# Patient Record
Sex: MALE | Race: BLACK OR AFRICAN AMERICAN
[De-identification: names, ages, dates, MRNs, and addresses within clinical notes are randomized per-mention and may not be internally consistent; named-entity substitution may affect disease eponyms.]

---

## 2021-09-17 PROBLEM — Z00.00 ENCOUNTER FOR PREVENTIVE HEALTH EXAMINATION: Status: ACTIVE | Noted: 2021-09-17

## 2021-09-23 ENCOUNTER — APPOINTMENT (OUTPATIENT)
Dept: UROLOGY | Facility: CLINIC | Age: 51
End: 2021-09-23
Payer: COMMERCIAL

## 2021-09-23 ENCOUNTER — NON-APPOINTMENT (OUTPATIENT)
Age: 51
End: 2021-09-23

## 2021-09-23 VITALS
SYSTOLIC BLOOD PRESSURE: 194 MMHG | WEIGHT: 162 LBS | BODY MASS INDEX: 25.43 KG/M2 | DIASTOLIC BLOOD PRESSURE: 133 MMHG | TEMPERATURE: 98.6 F | HEART RATE: 91 BPM | HEIGHT: 67 IN

## 2021-09-23 PROCEDURE — 99203 OFFICE O/P NEW LOW 30 MIN: CPT

## 2021-09-23 NOTE — PHYSICAL EXAM
[Urethral Meatus] : meatus normal [Penis Abnormality] : normal circumcised penis [Scrotum] : the scrotum was normal [Epididymis] : the epididymides were normal [Testes Mass (___cm)] : there were no testicular masses [FreeTextEntry1] : Bilateral 12cc testes

## 2021-09-23 NOTE — HISTORY OF PRESENT ILLNESS
[FreeTextEntry1] : Mr. Laws is here due to ED \par \par \par he is a father of 25 y and 19 y\par \par ERECTION IS 5-6/10\par GOOD DESIRE\par no morning erection \par also complaining of premature ejaculation 1-2 minutes after penetration\par \par Medical History Hypertension stopped many years ago\par surgical history of back lipoma \par \par he is intersted in trying PDE5 inhibitorsa

## 2021-09-27 LAB
APPEARANCE: CLEAR
BACTERIA UR CULT: NORMAL
BACTERIA: NEGATIVE
BILIRUBIN URINE: NEGATIVE
BLOOD URINE: ABNORMAL
COLOR: YELLOW
GLUCOSE QUALITATIVE U: NEGATIVE
HYALINE CASTS: 0 /LPF
KETONES URINE: NORMAL
LEUKOCYTE ESTERASE URINE: NEGATIVE
MICROSCOPIC-UA: NORMAL
NITRITE URINE: NEGATIVE
PH URINE: 5.5
PROTEIN URINE: NORMAL
PSA SERPL-MCNC: 1.08 NG/ML
RED BLOOD CELLS URINE: 2 /HPF
SPECIFIC GRAVITY URINE: 1.02
SQUAMOUS EPITHELIAL CELLS: 0 /HPF
UROBILINOGEN URINE: NORMAL
WHITE BLOOD CELLS URINE: 1 /HPF

## 2021-10-19 RX ORDER — SILDENAFIL 100 MG/1
100 TABLET, FILM COATED ORAL
Qty: 6 | Refills: 11 | Status: DISCONTINUED | COMMUNITY
Start: 2021-09-23 | End: 2021-10-19

## 2021-10-28 ENCOUNTER — APPOINTMENT (OUTPATIENT)
Dept: UROLOGY | Facility: CLINIC | Age: 51
End: 2021-10-28
Payer: COMMERCIAL

## 2021-10-28 VITALS — HEART RATE: 93 BPM | DIASTOLIC BLOOD PRESSURE: 122 MMHG | SYSTOLIC BLOOD PRESSURE: 184 MMHG | TEMPERATURE: 98.5 F

## 2021-10-28 PROCEDURE — 99215 OFFICE O/P EST HI 40 MIN: CPT

## 2021-10-28 NOTE — HISTORY OF PRESENT ILLNESS
[Currently Experiencing ___] :  [unfilled] [Erectile Dysfunction] : Erectile Dysfunction [None] : None [FreeTextEntry1] : 51 yr old male with Hx of HTN and ED\par has not tried tadalafil 20 mg\par Good desire, 5-6/10 erection\par no morning erection\par Complains of left side penile curvature on erection for a year - bothersome\par Denies dysuria, hematuria, flank pain

## 2021-10-28 NOTE — PHYSICAL EXAM
[General Appearance - Well Developed] : well developed [General Appearance - Well Nourished] : well nourished [Normal Appearance] : normal appearance [Well Groomed] : well groomed [General Appearance - In No Acute Distress] : no acute distress [Abdomen Soft] : soft [Abdomen Tenderness] : non-tender [Costovertebral Angle Tenderness] : no ~M costovertebral angle tenderness [Urethral Meatus] : meatus normal [Penis Abnormality] : normal uncircumcised penis [Urinary Bladder Findings] : the bladder was normal on palpation [Scrotum] : the scrotum was normal [No Prostate Nodules] : no prostate nodules [Edema] : no peripheral edema [] : no respiratory distress [Respiration, Rhythm And Depth] : normal respiratory rhythm and effort [Exaggerated Use Of Accessory Muscles For Inspiration] : no accessory muscle use [Oriented To Time, Place, And Person] : oriented to person, place, and time [Affect] : the affect was normal [Mood] : the mood was normal [Not Anxious] : not anxious [Normal Station and Gait] : the gait and station were normal for the patient's age [No Focal Deficits] : no focal deficits [No Palpable Adenopathy] : no palpable adenopathy [FreeTextEntry1] : no palpable plaque,

## 2021-10-28 NOTE — ASSESSMENT
[FreeTextEntry1] : Erectile Dysfunction\par Reviewed treatment options\par Reinforced tadalafil 20 mg benefits and s/e\par \par Penile Curvature\par left sided penile curvature on erect\par no palpable plaque\par no pain\par reassured\par Penile doppler for curve assessment \par \par Follow up Penile doppler

## 2021-11-11 ENCOUNTER — APPOINTMENT (OUTPATIENT)
Dept: UROLOGY | Facility: CLINIC | Age: 51
End: 2021-11-11
Payer: COMMERCIAL

## 2021-11-11 VITALS — HEART RATE: 92 BPM | DIASTOLIC BLOOD PRESSURE: 138 MMHG | TEMPERATURE: 98.7 F | SYSTOLIC BLOOD PRESSURE: 190 MMHG

## 2021-11-11 DIAGNOSIS — Q55.61 CURVATURE OF PENIS (LATERAL): ICD-10-CM

## 2021-11-11 PROCEDURE — 99214 OFFICE O/P EST MOD 30 MIN: CPT | Mod: 25

## 2021-11-11 PROCEDURE — 54235 NJX CORPORA CAVERNOSA RX AGT: CPT

## 2021-11-11 PROCEDURE — 93980 PENILE VASCULAR STUDY: CPT

## 2021-11-11 NOTE — ASSESSMENT
[FreeTextEntry1] : chronic peyronies disease\par We had a long conversation regarding treatment options in chronic Peyronie's disease. Treatments, including observation, the role of Xiaflex, penile plication surgery, plaque incision and grafting surgery, and insertion of inflatable penile prosthesis, were discussed at length.\par \par The risks and benefits of Xiaflex were discussed. These include, significant hematoma and a small risk of penile fracture. The perceived and reported benefits of this treatment were discussed at length as well. The fact that he needs to abstain from sexual activity after treatment was discussed as was the length of the treatment regimen. In addition he understands that he will need to use a penile traction device and we spoke about the role of penile modeling. Literature fully delineating what to expect from this treatment was provided to the patient. \par \par In addition, the risks of surgical treatment were discussed. With penile plication, he understands that shortening of the penis may be perceived. In addition, palpable knots from the sutures placed under the skin may be present. Possible risk of injury to the urethra and to other penile structures was discussed at length as well. Decreased penile sensitivity has also been reported. With plaque incision and grafting, the risk of long-standing erectile dysfunction in the postoperative period was discussed. Glans numbness and decreased penile sensitivity as a risk of the procedure was discussed as well.\par \par Finally, the role of inflatable penile prosthesis as a treatment for both Peyronie's disease and erectile dysfunction was discussed. The 1-2% risk of device infection, device malfunction rates, risks of injury to contiguous organs including the urethra, risk of phallic shortening as well as the possiblity that additional intraoperative adjunctive maneuvers would be necessary (including modeling and plication) to get him to a straight phallus was also discussed.\par \par he and his partner will discuss\par f/u PRN\par PSA 1.1\par UA negative, UCX no growth

## 2021-12-29 ENCOUNTER — APPOINTMENT (OUTPATIENT)
Dept: UROLOGY | Facility: CLINIC | Age: 51
End: 2021-12-29
Payer: COMMERCIAL

## 2021-12-29 VITALS — DIASTOLIC BLOOD PRESSURE: 110 MMHG | HEART RATE: 109 BPM | TEMPERATURE: 98.6 F | SYSTOLIC BLOOD PRESSURE: 166 MMHG

## 2021-12-29 DIAGNOSIS — N52.9 MALE ERECTILE DYSFUNCTION, UNSPECIFIED: ICD-10-CM

## 2021-12-29 PROCEDURE — 99213 OFFICE O/P EST LOW 20 MIN: CPT

## 2021-12-30 NOTE — ASSESSMENT
[FreeTextEntry1] : Erectile Dysfunction\par Continue tadalafil 20 mg - refilled\par reassured\par \par Peyronie's disease\par Reviewed treatment options from last visit\par Penile US Nov 1, 2021:\par 40 degree left sided curvature, mid shaft, \par 1.7 mm plaque\par He will make a decision and will call for appointment\par \par PSA 1.08 Sept 23, 2021\par Negative UA and UC\par \par Follow up PRN\par

## 2021-12-30 NOTE — HISTORY OF PRESENT ILLNESS
[Currently Experiencing ___] :  [unfilled] [Erectile Dysfunction] : Erectile Dysfunction [None] : None [FreeTextEntry1] : 51 yr old male with Hx of HTN, ED and Peyronie's\par Returns for follow up \par Reports medication works well\par Currently on tadalafil 20 mg, 8/10 erection\par Bothersome\par PSA 1.08 - Sept 2021\par Denies dysuria, hematuria, flank pain \par \par Penile US Nov11, 2021:\par 40 degree left sided curvature, mid shaft, \par 1.7 mm plaque

## 2021-12-30 NOTE — PHYSICAL EXAM
[General Appearance - Well Developed] : well developed [General Appearance - Well Nourished] : well nourished [Normal Appearance] : normal appearance [Well Groomed] : well groomed [General Appearance - In No Acute Distress] : no acute distress [Abdomen Soft] : soft [Abdomen Tenderness] : non-tender [Costovertebral Angle Tenderness] : no ~M costovertebral angle tenderness [Urethral Meatus] : meatus normal [Urinary Bladder Findings] : the bladder was normal on palpation [Scrotum] : the scrotum was normal [Testes Mass (___cm)] : there were no testicular masses [Edema] : no peripheral edema [] : no respiratory distress [Respiration, Rhythm And Depth] : normal respiratory rhythm and effort [Exaggerated Use Of Accessory Muscles For Inspiration] : no accessory muscle use [Oriented To Time, Place, And Person] : oriented to person, place, and time [Affect] : the affect was normal [Mood] : the mood was normal [Not Anxious] : not anxious [Normal Station and Gait] : the gait and station were normal for the patient's age [No Focal Deficits] : no focal deficits [No Palpable Adenopathy] : no palpable adenopathy [FreeTextEntry1] : no palpable plaque

## 2022-03-01 RX ORDER — COLLAGENASE CLOSTRIDIUM HISTOLYTICUM 0.9 MG
0.9 KIT INJECTION
Qty: 2 | Refills: 4 | Status: ACTIVE | COMMUNITY
Start: 2022-03-01 | End: 1900-01-01

## 2022-03-30 ENCOUNTER — APPOINTMENT (OUTPATIENT)
Dept: UROLOGY | Facility: CLINIC | Age: 52
End: 2022-03-30

## 2022-04-01 ENCOUNTER — APPOINTMENT (OUTPATIENT)
Dept: UROLOGY | Facility: CLINIC | Age: 52
End: 2022-04-01
Payer: COMMERCIAL

## 2022-04-01 VITALS
SYSTOLIC BLOOD PRESSURE: 167 MMHG | OXYGEN SATURATION: 98 % | DIASTOLIC BLOOD PRESSURE: 101 MMHG | HEIGHT: 67 IN | WEIGHT: 155 LBS | TEMPERATURE: 98.6 F | HEART RATE: 106 BPM | BODY MASS INDEX: 24.33 KG/M2

## 2022-04-01 PROCEDURE — 54200 INJECTION PX PEYRONIE DS: CPT

## 2022-04-07 ENCOUNTER — APPOINTMENT (OUTPATIENT)
Dept: UROLOGY | Facility: CLINIC | Age: 52
End: 2022-04-07
Payer: COMMERCIAL

## 2022-04-07 VITALS — HEART RATE: 102 BPM | DIASTOLIC BLOOD PRESSURE: 137 MMHG | SYSTOLIC BLOOD PRESSURE: 181 MMHG | TEMPERATURE: 96.7 F

## 2022-04-07 PROCEDURE — 54200 INJECTION PX PEYRONIE DS: CPT | Mod: 58

## 2022-04-07 RX ORDER — COLLAGENASE CLOSTRIDIUM HISTOLYTICUM 0.9 MG
0.9 KIT INJECTION
Qty: 0 | Refills: 0 | Status: COMPLETED | OUTPATIENT
Start: 2022-04-07

## 2022-04-07 RX ADMIN — COLLAGENASE CLOSTRIDIUM HISTOLYTICUM 0 MG: KIT at 00:00

## 2022-04-20 ENCOUNTER — NON-APPOINTMENT (OUTPATIENT)
Age: 52
End: 2022-04-20

## 2022-05-19 ENCOUNTER — APPOINTMENT (OUTPATIENT)
Dept: UROLOGY | Facility: CLINIC | Age: 52
End: 2022-05-19
Payer: COMMERCIAL

## 2022-05-19 VITALS — HEART RATE: 92 BPM | DIASTOLIC BLOOD PRESSURE: 136 MMHG | SYSTOLIC BLOOD PRESSURE: 178 MMHG | TEMPERATURE: 98.3 F

## 2022-05-19 PROCEDURE — 54200 INJECTION PX PEYRONIE DS: CPT

## 2022-05-26 ENCOUNTER — APPOINTMENT (OUTPATIENT)
Dept: UROLOGY | Facility: CLINIC | Age: 52
End: 2022-05-26

## 2022-05-26 VITALS
TEMPERATURE: 98.6 F | HEART RATE: 107 BPM | OXYGEN SATURATION: 98 % | DIASTOLIC BLOOD PRESSURE: 97 MMHG | SYSTOLIC BLOOD PRESSURE: 148 MMHG

## 2022-05-26 PROCEDURE — 54200 INJECTION PX PEYRONIE DS: CPT | Mod: 58

## 2022-05-26 RX ORDER — COLLAGENASE CLOSTRIDIUM HISTOLYTICUM 0.9 MG
0.9 KIT INJECTION
Refills: 0 | Status: COMPLETED | OUTPATIENT
Start: 2022-05-26

## 2022-05-26 RX ADMIN — COLLAGENASE CLOSTRIDIUM HISTOLYTICUM 0 MG: KIT at 00:00

## 2022-06-21 ENCOUNTER — NON-APPOINTMENT (OUTPATIENT)
Age: 52
End: 2022-06-21

## 2022-07-08 ENCOUNTER — APPOINTMENT (OUTPATIENT)
Dept: UROLOGY | Facility: CLINIC | Age: 52
End: 2022-07-08

## 2022-07-08 VITALS
WEIGHT: 155 LBS | BODY MASS INDEX: 24.33 KG/M2 | HEIGHT: 67 IN | DIASTOLIC BLOOD PRESSURE: 113 MMHG | TEMPERATURE: 98.1 F | HEART RATE: 99 BPM | SYSTOLIC BLOOD PRESSURE: 169 MMHG

## 2022-07-08 PROCEDURE — 54200 INJECTION PX PEYRONIE DS: CPT

## 2022-07-08 RX ORDER — COLLAGENASE CLOSTRIDIUM HISTOLYTICUM 0.9 MG
0.9 KIT INJECTION
Refills: 0 | Status: COMPLETED | OUTPATIENT
Start: 2022-07-08

## 2022-07-08 RX ADMIN — COLLAGENASE CLOSTRIDIUM HISTOLYTICUM 0 MG: KIT at 00:00

## 2022-07-15 ENCOUNTER — APPOINTMENT (OUTPATIENT)
Dept: UROLOGY | Facility: CLINIC | Age: 52
End: 2022-07-15

## 2022-07-15 VITALS
TEMPERATURE: 97.5 F | SYSTOLIC BLOOD PRESSURE: 180 MMHG | DIASTOLIC BLOOD PRESSURE: 118 MMHG | HEART RATE: 103 BPM | OXYGEN SATURATION: 99 %

## 2022-07-15 DIAGNOSIS — N48.6 INDURATION PENIS PLASTICA: ICD-10-CM

## 2022-07-15 PROCEDURE — 54200 INJECTION PX PEYRONIE DS: CPT | Mod: 58

## 2022-07-15 RX ORDER — COLLAGENASE CLOSTRIDIUM HISTOLYTICUM 0.9 MG
0.9 KIT INJECTION
Refills: 0 | Status: COMPLETED | OUTPATIENT
Start: 2022-07-15

## 2022-07-15 RX ADMIN — COLLAGENASE CLOSTRIDIUM HISTOLYTICUM 0 MG: KIT at 00:00

## 2022-08-19 ENCOUNTER — APPOINTMENT (OUTPATIENT)
Dept: UROLOGY | Facility: CLINIC | Age: 52
End: 2022-08-19

## 2022-09-13 ENCOUNTER — APPOINTMENT (OUTPATIENT)
Dept: UROLOGY | Facility: CLINIC | Age: 52
End: 2022-09-13

## 2022-09-13 VITALS
HEART RATE: 85 BPM | BODY MASS INDEX: 24.33 KG/M2 | WEIGHT: 155 LBS | TEMPERATURE: 98.3 F | DIASTOLIC BLOOD PRESSURE: 122 MMHG | HEIGHT: 67 IN | SYSTOLIC BLOOD PRESSURE: 180 MMHG

## 2022-09-13 PROCEDURE — 99214 OFFICE O/P EST MOD 30 MIN: CPT | Mod: 25

## 2022-09-13 PROCEDURE — 54235 NJX CORPORA CAVERNOSA RX AGT: CPT

## 2022-09-13 PROCEDURE — 93980 PENILE VASCULAR STUDY: CPT

## 2022-09-13 PROCEDURE — J3490T: CUSTOM

## 2022-09-13 RX ORDER — TADALAFIL 20 MG/1
20 TABLET ORAL
Qty: 6 | Refills: 11 | Status: ACTIVE | COMMUNITY
Start: 2021-10-19 | End: 1900-01-01

## 2022-09-13 RX ORDER — SILDENAFIL 100 MG/1
100 TABLET, FILM COATED ORAL
Qty: 10 | Refills: 6 | Status: ACTIVE | COMMUNITY
Start: 2021-11-11 | End: 1900-01-01

## 2022-09-13 NOTE — HISTORY OF PRESENT ILLNESS
[FreeTextEntry1] : see attached penile doppler\par strong improvmenet noted following xiaflex\par here to discuss

## 2022-09-13 NOTE — ASSESSMENT
[FreeTextEntry1] : chornic peyronies\par good improvement after 6 xiaflex\par minimal residual bother\par We had a long conversation regarding treatment options in chronic Peyronie's disease. Treatments, including observation, the role of Xiaflex, penile plication surgery, plaque incision and grafting surgery, and insertion of inflatable penile prosthesis, were discussed at length.\par \par The risks and benefits of Xiaflex were discussed. These include, significant hematoma and a small risk of penile fracture. The perceived and reported benefits of this treatment were discussed at length as well. The fact that he needs to abstain from sexual activity after treatment was discussed as was the length of the treatment regimen. In addition he understands that he will need to use a penile traction device and we spoke about the role of penile modeling. Literature fully delineating what to expect from this treatment was provided to the patient. \par \par In addition, the risks of surgical treatment were discussed. With penile plication, he understands that shortening of the penis may be perceived. In addition, palpable knots from the sutures placed under the skin may be present. Possible risk of injury to the urethra and to other penile structures was discussed at length as well. Decreased penile sensitivity has also been reported. With plaque incision and grafting, the risk of long-standing erectile dysfunction in the postoperative period was discussed. Glans numbness and decreased penile sensitivity as a risk of the procedure was discussed as well.\par \par Finally, the role of inflatable penile prosthesis as a treatment for both Peyronie's disease and erectile dysfunction was discussed. The 1-2% risk of device infection, device malfunction rates, risks of injury to contiguous organs including the urethra, risk of phallic shortening as well as the possiblity that additional intraoperative adjunctive maneuvers would be necessary (including modeling and plication) to get him to a straight phallus was also discussed.\par \par opts to observe\par refilled viagra/cialis\par f/u 1 eyar\par

## 2022-10-03 ENCOUNTER — NON-APPOINTMENT (OUTPATIENT)
Age: 52
End: 2022-10-03

## 2022-10-05 ENCOUNTER — NON-APPOINTMENT (OUTPATIENT)
Age: 52
End: 2022-10-05

## 2023-02-03 ENCOUNTER — APPOINTMENT (OUTPATIENT)
Dept: INTERNAL MEDICINE | Facility: CLINIC | Age: 53
End: 2023-02-03

## 2023-05-22 ENCOUNTER — APPOINTMENT (OUTPATIENT)
Dept: INTERNAL MEDICINE | Facility: CLINIC | Age: 53
End: 2023-05-22